# Patient Record
Sex: FEMALE | Race: WHITE | NOT HISPANIC OR LATINO | Employment: FULL TIME | URBAN - METROPOLITAN AREA
[De-identification: names, ages, dates, MRNs, and addresses within clinical notes are randomized per-mention and may not be internally consistent; named-entity substitution may affect disease eponyms.]

---

## 2024-01-08 ENCOUNTER — MEDICAL CORRESPONDENCE (OUTPATIENT)
Dept: HEALTH INFORMATION MANAGEMENT | Facility: CLINIC | Age: 54
End: 2024-01-08
Payer: COMMERCIAL

## 2024-01-09 ENCOUNTER — TRANSCRIBE ORDERS (OUTPATIENT)
Dept: OTHER | Age: 54
End: 2024-01-09

## 2024-01-09 DIAGNOSIS — H90.6 MIXED CONDUCTIVE AND SENSORINEURAL HEARING LOSS OF BOTH EARS: ICD-10-CM

## 2024-01-09 DIAGNOSIS — H93.A3 PULSATILE TINNITUS OF BOTH EARS: ICD-10-CM

## 2024-01-09 DIAGNOSIS — H83.8X3 SUPERIOR SEMICIRCULAR CANAL DEHISCENCE OF BOTH EARS: Primary | ICD-10-CM

## 2024-01-16 ENCOUNTER — TELEPHONE (OUTPATIENT)
Dept: OTOLARYNGOLOGY | Facility: CLINIC | Age: 54
End: 2024-01-16
Payer: COMMERCIAL

## 2024-01-23 ENCOUNTER — TELEPHONE (OUTPATIENT)
Dept: OTOLARYNGOLOGY | Facility: CLINIC | Age: 54
End: 2024-01-23
Payer: COMMERCIAL

## 2024-01-23 NOTE — TELEPHONE ENCOUNTER
1. Have you noticed any changes in hearing? Yes  2. Do you have ringing, buzzing, or other sounds in your ears or head, this is also referred to as Tinnitus? Yes  3. When and where was your last hearing test? Supa Azar MD  [facility]   4. Do you feel lightheaded or foggy? No  5. Do you have a spinning sensation? No  6. Is there any specific position that can bring on dizziness? no  7. Does looking up cause dizziness? No  8. Does getting in and our of bed cause dizziness? No  9. Does turning over in bed increase or cause dizziness? No  10. Does bending over cause dizziness? No  11. Is there anything that you can do to prevent the dizziness? no  12. Has the dizziness gotten better with time? No  13. Have you seen Physical Therapy for dizziness? (Please indicate clinic and as much of the location as possible): No  14. Are you being referred to a specific physician? Yes: Tong  15. Have you been evaluated/treated for your dizziness at any other location?  (If yes,obtian as much clinic/provider/locaiton as possible) Yes. (If yes answer the following questions:)   Have you seen any ENT, Neurology, or other providers for these symptoms?             Yes, If yes, where? Supa Azar MD  [facility]. Possibly [facility]  if yes, who?    Have you had any balance or Audiology testing? No Have you had an MRI or CT scan of your head or neck? Yes, If yes, where? Supa Azar MD [facility]  if yes, who?     Would you like to receive your Release of Information by mail or e-mail?  e-mail

## 2024-01-24 NOTE — TELEPHONE ENCOUNTER
FUTURE VISIT INFORMATION      FUTURE VISIT INFORMATION:  Date: 7/12/24  Time: 11:30 AM  Location: [facility]  REFERRAL INFORMATION:  Referring provider:  Supa Azar MD   Referring providers clinic:  [facility]    Reason for visit/diagnosis:  SSCD- Referred by Supa Azar MD [facility]     RECORDS REQUESTED FROM      Clinic name Comments Records Status Imaging Status   [facility]   12/12/24 OV with Supa Azar MD    11/21/23 audiogram  CE    Imaging  [facility] MRN: [id number] 12/29/23 CT IAC CE Req 1/24/24  PACS           January 24, 2024 3:21 PM - image request from Elizabeth Myrick -[facility]  January 24, 2024 3:25 PM - Forward to audiology to review -[facility]  January 25, 2024 1:20 PM - CT image resolved in PACS -[facility]

## 2024-03-12 DIAGNOSIS — R42 DIZZINESS: Primary | ICD-10-CM

## 2024-03-12 NOTE — TELEPHONE ENCOUNTER
Requesting orders for hearing test and VEMP testing prior to patient's ENT appointment with Shruti Tong M.D.      Chart Review: Patient is being referred for SSCD found on a CT scan. Per notes from referring provider at [facility]:  Patient is having symptoms of hearing loss, ringing/thumping in ears and sometime can hear heart beat really loudly. Hearing loss has been gradual, but worse in the past year. Patient has a history of loud noise exposure through concerts. She sustained prior head trauma with LOC, and that is when she really started noticing the thumping.  No mention of dizziness in their notes or on Dizzy Intake Questionnaire.     Audiogram 11/21/23 ([facility]):  Bilateral mild sloping to severe mixed hearing loss. Air bone gaps present at 500  Hz bilaterally, and 1000 Hz on the right. Bone conduction was not tested at 250 Hz. Normal tympanograms. 100% word rec bilaterally.       CT 12/29/23:  CONCLUSION:    1.  Bilateral superior semicircular canals dehiscence.   2.  Right TMJ osteoarthrosis.       Cale Velez.  Licensed Audiologist  [place] # [id number]

## 2024-06-26 ENCOUNTER — OFFICE VISIT (OUTPATIENT)
Dept: AUDIOLOGY | Facility: CLINIC | Age: 54
End: 2024-06-26
Payer: COMMERCIAL

## 2024-06-26 DIAGNOSIS — H90.6 MIXED HEARING LOSS, BILATERAL: Primary | ICD-10-CM

## 2024-06-26 DIAGNOSIS — R42 DIZZINESS: ICD-10-CM

## 2024-06-26 PROCEDURE — 92557 COMPREHENSIVE HEARING TEST: CPT | Performed by: AUDIOLOGIST

## 2024-06-26 PROCEDURE — 92550 TYMPANOMETRY & REFLEX THRESH: CPT | Performed by: AUDIOLOGIST

## 2024-06-26 PROCEDURE — 92519 VEMP TST I&R CERVICAL&OCULAR: CPT | Performed by: AUDIOLOGIST

## 2024-06-26 NOTE — PROGRESS NOTES
AUDIOLOGY REPORT    SUMMARY: Audiology visit completed. See audiogram for results.      RECOMMENDATIONS: Follow-up with ENT.      Dontrell Jiang  Audiologist  [place] License  #[id number]

## 2024-06-26 NOTE — PROGRESS NOTES
AUDIOLOGY REPORT    SUBJECTIVE: Michelle C Thoennes was seen in the Audiology Clinic at the [facility] [place] on 6/26/2024 for a vestibular evoked myogenic potential (VEMP) evaluation, referred by Shruti Tong M.D.. Darlyn reports that she first noticed her symptoms several years ago.  She reports she was at an [facility] [facility] concert and felt the sound was so loud it was painful.  She also reports she felt she could not hear very well after the concert.  Then in either 2012 or 2013 , she had a panic attack which caused her to faint.  She reports falling forward and hitting her head in the middle of her head and losing consciousness.  When she came to go she reports her hearing was decreased in both the ears.  Darlyn rarely experiences any true vertigo, explaining that it has only happened on a few occasions in the last several years and is associated with an increase in heart rate.  Otherwise on a day-to-day basis she does not report dizzy symptoms.  Rather her bothersome symptoms include hearing her own voice in her head at a very loud level, and an occasional bilateral thumping sound that she feels is worse while lying down.  Darlyn reports that her  will ask her to speak up because she talks too quietly as she sounds very loud inside her own head.    Darlyn denies that walking in a grocery store, watching TV or scrolling on a phone, rolling over in bed, looking up or bending down bring on symptoms.  She reports when she is playing pickle ball and watching the ball moving and turning her head quickly from side-to-side, that she may feel she she needs to stop and focus on 1 spot.  She feels her balance is good and that she does not veer or sway when walking.  She denies a history of falls.  Darlyn denies any motion intolerance and does not feel that she is still in motion post cessation of motion.  She denies feeling any dizziness when she coughs, sneezes, blows her  nose, or bears down.  She denies being able to hear her eye movements or eye blinks.   Darlyn reports a history of migraines.  She takes medication for this as needed.  On occasion she has some sensitivity to light, but denies that loud sounds make her dizzy.  Occasional lifting heavy things or bearing down can give her a short feeling of dizziness that goes away quickly.  Darlyn denies any vision concerns and a history of eye surgeries.  She does not have a history of cancer or chemotherapy.   Hearing evaluations have revealed a mild to moderate bilateral primarily conductive hearing loss, with normal reflexes and normal tympanograms..     OBJECTIVE:   Abuse Screening:  Do you feel unsafe at home or work/school? No  Do you feel threatened by someone? No  Does anyone try to keep you from having contact with others, or doing things outside of your home? No  Physical signs of abuse present? No    VEMP testing is performed using an auditory evoked potentials system. Surface electrodes are placed in several locations on the patient's head and neck in order to record muscle motoneuron activity in response to loud auditory stimuli. This testing assesses very specific vestibular pathways in the inner ear and central nervous system. cVEMP testing is performed with electrodes placed on the patient's sternocleidomastoid muscle, and is used to assess the saccular organ, afferent inferior vestibular nerve and vestibular nuclei within the brainstem. oVEMP testing is performed with electrodes placed under the patient's eyes, and is used to assess the utricle and afferent superior vestibular nerve and nuclei within the brainstem.  Patients that may benefit from this procedure are those with complaints of vertigo and imbalance or those suspected of superior canal dehiscence. A total of 90 minutes was spent completing the VEMP testing today.    Tympanograms   Completed during audio today.    cVEMP Thresholds via 500 Hz  toneburst:    RIGHT: 80 dB nHL which  suggest normal saccular and inferior vestibular nerve function    LEFT: 80 dB nHL which  suggest normal saccular and inferior vestibular nerve function    AMPLITUDE ASYMMETRY: 1% (greater than 33% is considered abnormal)    oVEMP Thresholds via 500 Hz toneburst:     RIGHT: 75 dB nHL which suggests abnormal utricle or superior vestibular nerve function    LEFT: 70 dB nHL which suggests abnormal utricle or superior vestibular nerve function    AMPLITUDE ASYMMETRY: 23% (greater than 33% is considered abnormal)    ASSESSMENT: Today's results suggest a normal cVEMP evaluation and abnormal oVEMP evaluation bilaterally. These results suggest normal saccular and inferior vestibular nerve function and abnormal utricle or superior vestibular nerve function.      PLAN:The patient will follow up with Shruti Tong M.D. for medical management.     Please call this clinic with questions regarding these results or recommendations.      AZAM Badillo.   Audiology Doctoral Student    I was present with the patient for the entire Audiology appointment including all procedures/testing performed by the AuD student, and agree with the student s assessment and plan as documented.      Bianca Delgado, [facility]  Licensed Audiologist  [place] #[id number]

## 2024-06-26 NOTE — PROGRESS NOTES
AUDIOLOGY REPORT    SUBJECTIVE: Michelle C Thoennes was seen in the Audiology Clinic at the [facility] [place] on 6/26/2024 for a vestibular evoked myogenic potential (VEMP) evaluation, referred by *** M.D.. Darlyn reports ***. Hearing evaluations have revealed ***. {They were accompanied today by their {ACCOMPANIED BY (ADULT):505052}.}    OBJECTIVE:   Abuse Screening:  Do you feel unsafe at home or work/school? {Audioyesnounabletoanswer:299720}  Do you feel threatened by someone? {Audioyesnounabletoanswer:145654}  Does anyone try to keep you from having contact with others, or doing things outside of your home? {Audioyesnounabletoanswer:406890}  Physical signs of abuse present? {Audioyesdescriptionof findings:059756}    VEMP testing is performed using an auditory evoked potentials system. Surface electrodes are placed in several locations on the patient's head and neck in order to record muscle motoneuron activity in response to loud auditory stimuli. This testing assesses very specific vestibular pathways in the inner ear and central nervous system. cVEMP testing is performed with electrodes placed on the patient's sternocleidomastoid muscle, and is used to assess the saccular organ, afferent inferior vestibular nerve and vestibular nuclei within the brainstem. oVEMP testing is performed with electrodes placed under the patient's eyes, and is used to assess the utricle and afferent superior vestibular nerve and nuclei within the brainstem.  Patients that may benefit from this procedure are those with complaints of vertigo and imbalance or those suspected of superior canal dehiscence. A total of 90 minutes was spent completing the VEMP testing today.    Tympanograms      RIGHT: {TYMPANOGRAM FINDINGS:332254}.     LEFT: {TYMPANOGRAM FINDINGS:714952}    cVEMP Thresholds via 500 Hz toneburst:    RIGHT: *** dB nHL which  {CVEMP INTERPRETATION:703427}    LEFT: *** dB nHL which   {CVEMP INTERPRETATION:696879}    AMPLITUDE ASYMMETRY: *** (greater than 33% is considered abnormal)    oVEMP Thresholds via 500 Hz toneburst:     RIGHT: *** dB nHL which suggests {OVEMP INTERPRETATION:339057}    LEFT: *** dB nHL which suggests {OVEMP INTERPRETATION:422294}    AMPLITUDE ASYMMETRY: *** (greater than 33% is considered abnormal)    ASSESSMENT: Today's results suggest {VEMP FINDINGS:025152}. These results {CVEMP INTERPRETATION:442602} and {OVEMP INTERPRETATION:237461}.      PLAN:The patient will follow up with ***, M.D. for medical management.     Please call this clinic with questions regarding these results or recommendations.      ***

## 2024-07-12 ENCOUNTER — VIRTUAL VISIT (OUTPATIENT)
Dept: OTOLARYNGOLOGY | Facility: CLINIC | Age: 54
End: 2024-07-12
Payer: COMMERCIAL

## 2024-07-12 ENCOUNTER — PRE VISIT (OUTPATIENT)
Dept: OTOLARYNGOLOGY | Facility: CLINIC | Age: 54
End: 2024-07-12

## 2024-07-12 VITALS — BODY MASS INDEX: 21.33 KG/M2 | WEIGHT: 128 LBS | HEIGHT: 65 IN

## 2024-07-12 DIAGNOSIS — H83.8X3 SUPERIOR SEMICIRCULAR CANAL DEHISCENCE OF BOTH EARS: Primary | ICD-10-CM

## 2024-07-12 DIAGNOSIS — H90.6 MIXED CONDUCTIVE AND SENSORINEURAL HEARING LOSS OF BOTH EARS: ICD-10-CM

## 2024-07-12 PROCEDURE — 99204 OFFICE O/P NEW MOD 45 MIN: CPT | Mod: 95 | Performed by: OTOLARYNGOLOGY

## 2024-07-12 ASSESSMENT — PAIN SCALES - GENERAL: PAINLEVEL: NO PAIN (0)

## 2024-07-12 NOTE — PROGRESS NOTES
"Darlyn is a 54 year old who is being evaluated via a billable video visit.    How would you like to obtain your AVS? MyChart  If the video visit is dropped, the invitation should be resent by: Send to e-mail at: [email or web address]  Will anyone else be joining your video visit? {:671311}  {If patient encounters technical issues they should call [phone] :578360}    {PROVIDER CHARTING PREFERENCE:809917}    Subjective   Darlyn is a 54 year old, presenting for the following health issues:  No chief complaint on file.    HPI     ***    {ROS Picklists (Optional):167210}      Objective           Vitals:  No vitals were obtained today due to virtual visit.    Physical Exam   {video visit exam brief selected:540140}    {Diagnostic Test Results (Optional):410193}      Video-Visit Details    Type of service:  Video Visit   Originating Location (pt. Location): {video visit patient location:877973::\"Home\"}  {PROVIDER LOCATION On-site should be selected for visits conducted from your clinic location or adjoining [facility] hospital, academic office, or other nearby [facility] building. Off-site should be selected for all other provider locations, including home:963643}  Distant Location (provider location):  {virtual location provider:338994}  Platform used for Video Visit: {Virtual Visit Platforms:356803::\"BitAccess\"}  Signed Electronically by: Shruti Tong MD  {Email feedback regarding this note to [email or web address]   :732750}    "

## 2024-07-12 NOTE — PROGRESS NOTES
Neurotology Clinic      Darlyn is a 54 year old who is being evaluated via a billable telephone visit.     Name: Michelle C Thoennes  MRN: [id number]  Age: 54 year old  : 1970  Referring provider: Supa Azar  2024     Chief Complaint:   Consultation     History of Present Illness:   Michelle C Thoennes is a 54 year old female with bilateral sensorineural hearing loss and superior semicircular canal dehiscence who presents for consultation.  Consultation was kindly requested by Dr. Supa Azar.      Her symptoms date back 10 years or more.  She recalls in  or 2013 falling forward and hitting her head and losing consciousness.  Following this she was aware of some hearing loss in both the ears.  While this was manageable for period of time, more recently she has been noting more difficulty with hearing and communication.  She has been using Eargo devices which is a form of over-the-counter hearing aid but they have not been useful on a regular basis and they do not help her.  She is also having challenges in loud environments and with multiple people talking.    For as long as she can remember, she has been aware of the internal sound of her voice seeming loud.  She speaks softly because it seems like she is speaking more loudly than she would like to in a public place and in order to avoid broadcasting this leads to soft-spokenness.  She does not hear her eyes moving in her head but occasionally has been able to hear her own blinking.  She occasionally hears pulsatile tinnitus when she is very still and it can be bothersome due to the thumping noise but she also is able to to this out and is not aware of this on a daily basis.  There is no difference in the sensation between ears of this.    She has no vestibular symptoms.  She is not bothered by dizziness.  Loud sounds or sneezes or other pressure exposures do not cause dizziness.  When she is performing vigorous activity she  has noted that she occasionally needs to stop and focus, such as when she is playing pickle ball.  However she would consider herself to have good balance.    She does have a history of migraine headaches and they are more prominent during the school year and can be with lights on her computer or with stress.  She has rizatriptan to be used with the headaches.  They usually last about 2 days and then she is able to function and go back to work.  These are not as bad as they were in the past.        Review of Systems:   Pertinent items are noted in HPI.        No data to display                 Active Medications:   No current outpatient medications on file.   Triptan available for migraine    Allergies:   Patient has no known allergies.      Past Medical History:  Migraine headache     Past Surgical History:  No past surgical history on file.    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  PSYCH: Alert and oriented times 3; coherent speech, normal   rate and volume, able to articulate logical thoughts, able   to abstract reason, no tangential thoughts, no hallucinations   or delusions   RESP: No cough, no audible wheezing, able to talk in full sentences  Remainder of exam unable to be completed due to telephone visits    Audiogram:  AUDIOGRAM: She underwent an audiogram June 26, 2024. This demonstrated:  Mild to moderate mixed hearing loss bilaterally with low-frequency conductive component and supranormal bone-conduction thresholds consistent with canal dehiscence for that portion of the loss and then potentially more sensorineural loss in the high frequencies.     Right: Speech reception threshold is 35 dB with 100% word recognition. Tympanogram A type   Left: Speech reception threshold is 30 dB with 100% word recognition. Tympanogram A type     The acoustic  reflexes were intact in ipsilateral and contralateral testing conditions despite the presence of sizable conductive hearing loss.  This is consistent with superior semicircular canal dehiscence.    Audiogram was independently reviewed    Vestibular evoked myogenic potential testing:  This was independently reviewed    cVEMP Thresholds via 500 Hz toneburst:    RIGHT: 80 dB nHL which  suggest normal saccular and inferior vestibular nerve function    LEFT: 80 dB nHL which  suggest normal saccular and inferior vestibular nerve function    AMPLITUDE ASYMMETRY: 1% (greater than 33% is considered abnormal)     oVEMP Thresholds via 500 Hz toneburst:     RIGHT: 75 dB nHL which suggests abnormal utricle or superior vestibular nerve function    LEFT: 70 dB nHL which suggests abnormal utricle or superior vestibular nerve function    AMPLITUDE ASYMMETRY: 23% (greater than 33% is considered abnormal)     ASSESSMENT: Today's results suggest a normal cVEMP evaluation and abnormal oVEMP evaluation bilaterally. These results suggest normal saccular and inferior vestibular nerve function and abnormal utricle or superior vestibular nerve function      Imaging:  Temporal bone CT scan from December 29, 2023 was reviewed.  There is evidence of dehiscence of the dome of the superior semicircular canal on both sides.  I do not see an encephalocele.    Outside records from her referring otolaryngologist were independently reviewed.       Assessment and Plan:  Michelle C Thoennes is a 54 year old female with  Bilateral superior semicircular canal dehiscence with auditory symptoms and evidence of physiologic activity based on low oVEMP thresholds and presence of conductive hearing loss with supranormal bone-conduction.  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  She has auditory  symptoms only.  Predominantly, she is challenged by bilateral mixed hearing loss.  Head trauma has been known to induce degrees of sensorineural hearing loss in individuals with superior semicircular canal dehiscence and this may be the etiology of onset.  At the same time, we discussed that surgical occlusion of the superior semicircular canal does carry risk of additional hearing loss and occlusion of the canal would not be expected to normalize the sensorineural component of the hearing loss that has occurred.  There may also be additional etiologies of the high-frequency loss including presbycusis or genetic contributions or noise contributions.    She asked excellent questions and indicated that her only inclination to have a surgery for superior canal dehiscence would be if it would alleviate her hearing loss.  She has had the awareness of her voice being louder in her head for as long as she can remember and is not troubled by other autophony or third window symptoms.  Specifically, she does not have vestibular symptomatology.    Based on this, my recommendation would be for her to be fitted with hearing aids based on her audiometric profile rather than using over-the-counter approach.  Referral will be placed for her to pursue this closer to home and I would recommend that she complete the full hearing aid trial before considering further how she would like to proceed.  I would certainly be happy to discuss this with her in the future and to meet again if she has additional questions or if symptoms change over time.  I do recommend that she have annual audiometric follow-up which could be completed closer to home.    Electronically signed by:  Shruti Tong MD  Otology & Neurotology  [facility]    Phone call duration: 55 minutes  Start Time: 12:15 CST  End Time:  1:10 CST

## 2024-07-12 NOTE — LETTER
Hearing Aid Medical Clearance    Michelle C Thoennes  July 12, 2024        This patient has received a medical examination and may be considered a suitable candidate for a hearing aid.         Physician:__________________________________________________        Dr. Shruti Tong

## 2024-07-12 NOTE — LETTER
2024       RE: Michelle C Thoennes  [address] [place] [address]     Dear Colleague,    Thank you for referring your patient, Michelle C Thoennes, to the [facility]. Please see a copy of my visit note below.      Neurotology Clinic      Darlyn is a 54 year old who is being evaluated via a billable telephone visit.     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  Consultation was kindly requested by Dr. Supa Azar.      Her symptoms date back 10 years or more.  She recalls in  or  falling forward and hitting her head and losing consciousness.  Following this she was aware of some hearing loss in both the ears.  While this was manageable for period of time, more recently she has been noting more difficulty with hearing and communication.  She has been using [employer] devices which is a form of over-the-counter hearing aid but they have not been useful on a regular basis and they do not help her.  She is also having challenges in loud environments and with multiple people talking.    For as long as she can remember, she has been aware of the internal sound of her voice seeming loud.  She speaks softly because it seems like she is speaking more loudly than she would like to in a public place and in order to avoid broadcasting this leads to soft-spokenness.  She does not hear her eyes moving in her head but occasionally has been able to hear her own blinking.  She occasionally hears pulsatile tinnitus when she is very still and it can be bothersome due to the thumping noise  but she also is able to to this out and is not aware of this on a daily basis.  There is no difference in the sensation between ears of this.    She has no vestibular symptoms.  She is not bothered by dizziness.  Loud sounds or sneezes or other pressure exposures do not cause dizziness.  When she is performing vigorous activity she has noted that she occasionally needs to stop and focus, such as when she is playing pickle ball.  However she would consider herself to have good balance.    She does have a history of migraine headaches and they are more prominent during the school year and can be with lights on her computer or with stress.  She has rizatriptan to be used with the headaches.  They usually last about 2 days and then she is able to function and go back to work.  These are not as bad as they were in the past.        Review of Systems:   Pertinent items are noted in HPI.        No data to display                 Active Medications:   No current outpatient medications on file.   Triptan available for migraine    Allergies:   Patient has no known allergies.      Past Medical History:  Migraine headache     Past Surgical History:  No past surgical history on file.    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  PSYCH: Alert and oriented times 3; coherent speech, normal   rate and volume, able to articulate logical thoughts, able   to abstract reason, no tangential thoughts, no hallucinations   or delusions   RESP: No cough, no audible wheezing, able to talk in full sentences  Remainder of exam unable to be completed due to telephone visits    Audiogram:  AUDIOGRAM: She underwent an audiogram June 26, 2024. This demonstrated:  Mild to moderate mixed hearing loss bilaterally with low-frequency conductive component and supranormal bone-conduction thresholds  consistent with canal dehiscence for that portion of the loss and then potentially more sensorineural loss in the high frequencies.     Right: Speech reception threshold is 35 dB with 100% word recognition. Tympanogram A type   Left: Speech reception threshold is 30 dB with 100% word recognition. Tympanogram A type     The acoustic reflexes were intact in ipsilateral and contralateral testing conditions despite the presence of sizable conductive hearing loss.  This is consistent with superior semicircular canal dehiscence.    Audiogram was independently reviewed    Vestibular evoked myogenic potential testing:  This was independently reviewed    cVEMP Thresholds via 500 Hz toneburst:    RIGHT: 80 dB nHL which  suggest normal saccular and inferior vestibular nerve function    LEFT: 80 dB nHL which  suggest normal saccular and inferior vestibular nerve function    AMPLITUDE ASYMMETRY: 1% (greater than 33% is considered abnormal)     oVEMP Thresholds via 500 Hz toneburst:     RIGHT: 75 dB nHL which suggests abnormal utricle or superior vestibular nerve function    LEFT: 70 dB nHL which suggests abnormal utricle or superior vestibular nerve function    AMPLITUDE ASYMMETRY: 23% (greater than 33% is considered abnormal)     ASSESSMENT: Today's results suggest a normal cVEMP evaluation and abnormal oVEMP evaluation bilaterally. These results suggest normal saccular and inferior vestibular nerve function and abnormal utricle or superior vestibular nerve function      Imaging:  Temporal bone CT scan from December 29, 2023 was reviewed.  There is evidence of dehiscence of the dome of the superior semicircular canal on both sides.  I do not see an encephalocele.    Outside records from her referring otolaryngologist were independently reviewed.       Assessment and Plan:  Michelle C Thoennes is a 54 year old female with  Bilateral superior semicircular canal dehiscence with auditory symptoms and evidence of physiologic  activity based on low oVEMP thresholds and presence of conductive hearing loss with supranormal bone-conduction.  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  She has auditory symptoms only.  Predominantly, she is challenged by bilateral mixed hearing loss.  Head trauma has been known to induce degrees of sensorineural hearing loss in individuals with superior semicircular canal dehiscence and this may be the etiology of onset.  At the same time, we discussed that surgical occlusion of the superior semicircular canal does carry risk of additional hearing loss and occlusion of the canal would not be expected to normalize the sensorineural component of the hearing loss that has occurred.  There may also be additional etiologies of the high-frequency loss including presbycusis or genetic contributions or noise contributions.    She asked excellent questions and indicated that her only inclination to have a surgery for superior canal dehiscence would be if it would alleviate her hearing loss.  She has had the awareness of her voice being louder in her head for as long as she can remember and is not troubled by other autophony or third window symptoms.  Specifically, she does not have vestibular symptomatology.    Based on this, my recommendation would be for her to be fitted with hearing aids based on her audiometric profile rather than using over-the-counter approach.  Referral will be placed for her to pursue this closer to home and I would recommend that she complete the full hearing aid trial before considering further how she would like to proceed.  I would certainly be happy to discuss this with her in the future and to meet again if she has additional questions or if symptoms change over time.  I do recommend that she have annual audiometric follow-up  which could be completed closer to home.    Electronically signed by:  Shruti Tong MD  Otology & Neurotology  [facility]    Phone call duration: 55 minutes  Start Time: 12:15 CST  End Time:  1:10 CST        Again, thank you for allowing me to participate in the care of your patient.      Sincerely,    Shruti Tong MD

## 2024-07-12 NOTE — PATIENT INSTRUCTIONS
You were seen in the ENT Clinic today by Dr. Tong. If you have any questions or concerns after your appointment, please contact us (see below)      2.   Please return to clinic as needed.     3. A hearing aid clearance form and audiogram will be sent to you via mail.         How to Contact Us:  Send a Snapvinet message to your provider. Our team will respond to you via [employer]. Occasionally, we will need to call you to get further information.  For urgent matters (Monday-Friday), call the ENT Clinic: [phone] and speak with a call center team member - they will route your call appropriately.   If you'd like to speak directly with a nurse, please find our contact information below. We do our best to check voicemail frequently throughout the day, and will work to call you back within 1-2 days. For urgent matters, please use the general clinic phone numbers listed above.      Yasemin ORTA RN  ENT RN Care Coordinator  Direct: [phone]    Maria De Jesus QUICK LPN  Direct: [phone]

## 2024-07-28 ENCOUNTER — HEALTH MAINTENANCE LETTER (OUTPATIENT)
Age: 54
End: 2024-07-28

## 2025-08-10 ENCOUNTER — HEALTH MAINTENANCE LETTER (OUTPATIENT)
Age: 55
End: 2025-08-10

## 2025-08-31 ENCOUNTER — HEALTH MAINTENANCE LETTER (OUTPATIENT)
Age: 55
End: 2025-08-31